# Patient Record
Sex: FEMALE | Race: WHITE | NOT HISPANIC OR LATINO | URBAN - METROPOLITAN AREA
[De-identification: names, ages, dates, MRNs, and addresses within clinical notes are randomized per-mention and may not be internally consistent; named-entity substitution may affect disease eponyms.]

---

## 2018-07-22 ENCOUNTER — INPATIENT (INPATIENT)
Facility: HOSPITAL | Age: 83
LOS: 1 days | Discharge: NURSING, REHAB OR LONG-TERM CARE | DRG: 179 | End: 2018-07-24
Attending: INTERNAL MEDICINE | Admitting: INTERNAL MEDICINE
Payer: MEDICARE

## 2018-07-22 VITALS
SYSTOLIC BLOOD PRESSURE: 126 MMHG | TEMPERATURE: 98 F | OXYGEN SATURATION: 95 % | DIASTOLIC BLOOD PRESSURE: 72 MMHG | HEART RATE: 74 BPM | RESPIRATION RATE: 16 BRPM

## 2018-07-22 DIAGNOSIS — R76.11 NONSPECIFIC REACTION TO TUBERCULIN SKIN TEST WITHOUT ACTIVE TUBERCULOSIS: ICD-10-CM

## 2018-07-22 LAB
ALBUMIN SERPL ELPH-MCNC: 3.8 G/DL — SIGNIFICANT CHANGE UP (ref 3.3–5)
ALP SERPL-CCNC: 53 U/L — SIGNIFICANT CHANGE UP (ref 40–120)
ALT FLD-CCNC: 13 U/L — SIGNIFICANT CHANGE UP (ref 10–45)
ANION GAP SERPL CALC-SCNC: 11 MMOL/L — SIGNIFICANT CHANGE UP (ref 5–17)
APTT BLD: 34.7 SEC — SIGNIFICANT CHANGE UP (ref 27.5–37.4)
AST SERPL-CCNC: 19 U/L — SIGNIFICANT CHANGE UP (ref 10–40)
BASOPHILS # BLD AUTO: 0 K/UL — SIGNIFICANT CHANGE UP (ref 0–0.2)
BASOPHILS NFR BLD AUTO: 0.5 % — SIGNIFICANT CHANGE UP (ref 0–2)
BILIRUB SERPL-MCNC: 1.5 MG/DL — HIGH (ref 0.2–1.2)
BUN SERPL-MCNC: 23 MG/DL — SIGNIFICANT CHANGE UP (ref 7–23)
CALCIUM SERPL-MCNC: 8.7 MG/DL — SIGNIFICANT CHANGE UP (ref 8.4–10.5)
CHLORIDE SERPL-SCNC: 105 MMOL/L — SIGNIFICANT CHANGE UP (ref 96–108)
CO2 SERPL-SCNC: 25 MMOL/L — SIGNIFICANT CHANGE UP (ref 22–31)
CREAT SERPL-MCNC: 0.55 MG/DL — SIGNIFICANT CHANGE UP (ref 0.5–1.3)
EOSINOPHIL # BLD AUTO: 0.3 K/UL — SIGNIFICANT CHANGE UP (ref 0–0.5)
EOSINOPHIL NFR BLD AUTO: 5.4 % — SIGNIFICANT CHANGE UP (ref 0–6)
GLUCOSE BLDC GLUCOMTR-MCNC: 154 MG/DL — HIGH (ref 70–99)
GLUCOSE BLDC GLUCOMTR-MCNC: 171 MG/DL — HIGH (ref 70–99)
GLUCOSE SERPL-MCNC: 151 MG/DL — HIGH (ref 70–99)
HCT VFR BLD CALC: 37.3 % — SIGNIFICANT CHANGE UP (ref 34.5–45)
HGB BLD-MCNC: 12.8 G/DL — SIGNIFICANT CHANGE UP (ref 11.5–15.5)
INR BLD: 1.46 RATIO — HIGH (ref 0.88–1.16)
LYMPHOCYTES # BLD AUTO: 1.2 K/UL — SIGNIFICANT CHANGE UP (ref 1–3.3)
LYMPHOCYTES # BLD AUTO: 19.1 % — SIGNIFICANT CHANGE UP (ref 13–44)
MCHC RBC-ENTMCNC: 30.6 PG — SIGNIFICANT CHANGE UP (ref 27–34)
MCHC RBC-ENTMCNC: 34.3 GM/DL — SIGNIFICANT CHANGE UP (ref 32–36)
MCV RBC AUTO: 89.3 FL — SIGNIFICANT CHANGE UP (ref 80–100)
MONOCYTES # BLD AUTO: 0.6 K/UL — SIGNIFICANT CHANGE UP (ref 0–0.9)
MONOCYTES NFR BLD AUTO: 10.2 % — SIGNIFICANT CHANGE UP (ref 2–14)
NEUTROPHILS # BLD AUTO: 4.1 K/UL — SIGNIFICANT CHANGE UP (ref 1.8–7.4)
NEUTROPHILS NFR BLD AUTO: 64.7 % — SIGNIFICANT CHANGE UP (ref 43–77)
PLATELET # BLD AUTO: 144 K/UL — LOW (ref 150–400)
POTASSIUM SERPL-MCNC: 4.1 MMOL/L — SIGNIFICANT CHANGE UP (ref 3.5–5.3)
POTASSIUM SERPL-SCNC: 4.1 MMOL/L — SIGNIFICANT CHANGE UP (ref 3.5–5.3)
PROT SERPL-MCNC: 6.3 G/DL — SIGNIFICANT CHANGE UP (ref 6–8.3)
PROTHROM AB SERPL-ACNC: 16 SEC — HIGH (ref 9.8–12.7)
RBC # BLD: 4.17 M/UL — SIGNIFICANT CHANGE UP (ref 3.8–5.2)
RBC # FLD: 12.3 % — SIGNIFICANT CHANGE UP (ref 10.3–14.5)
SODIUM SERPL-SCNC: 141 MMOL/L — SIGNIFICANT CHANGE UP (ref 135–145)
WBC # BLD: 6.3 K/UL — SIGNIFICANT CHANGE UP (ref 3.8–10.5)
WBC # FLD AUTO: 6.3 K/UL — SIGNIFICANT CHANGE UP (ref 3.8–10.5)

## 2018-07-22 PROCEDURE — 71045 X-RAY EXAM CHEST 1 VIEW: CPT | Mod: 26

## 2018-07-22 PROCEDURE — 99222 1ST HOSP IP/OBS MODERATE 55: CPT | Mod: GC

## 2018-07-22 RX ORDER — AMLODIPINE BESYLATE 2.5 MG/1
5 TABLET ORAL DAILY
Qty: 0 | Refills: 0 | Status: DISCONTINUED | OUTPATIENT
Start: 2018-07-22 | End: 2018-07-24

## 2018-07-22 RX ORDER — HEPARIN SODIUM 5000 [USP'U]/ML
5000 INJECTION INTRAVENOUS; SUBCUTANEOUS EVERY 12 HOURS
Qty: 0 | Refills: 0 | Status: DISCONTINUED | OUTPATIENT
Start: 2018-07-22 | End: 2018-07-22

## 2018-07-22 RX ORDER — CITALOPRAM 10 MG/1
10 TABLET, FILM COATED ORAL DAILY
Qty: 0 | Refills: 0 | Status: DISCONTINUED | OUTPATIENT
Start: 2018-07-22 | End: 2018-07-24

## 2018-07-22 RX ORDER — INSULIN LISPRO 100/ML
VIAL (ML) SUBCUTANEOUS AT BEDTIME
Qty: 0 | Refills: 0 | Status: DISCONTINUED | OUTPATIENT
Start: 2018-07-22 | End: 2018-07-24

## 2018-07-22 RX ORDER — DEXTROSE 50 % IN WATER 50 %
25 SYRINGE (ML) INTRAVENOUS ONCE
Qty: 0 | Refills: 0 | Status: DISCONTINUED | OUTPATIENT
Start: 2018-07-22 | End: 2018-07-24

## 2018-07-22 RX ORDER — SIMVASTATIN 20 MG/1
10 TABLET, FILM COATED ORAL AT BEDTIME
Qty: 0 | Refills: 0 | Status: DISCONTINUED | OUTPATIENT
Start: 2018-07-22 | End: 2018-07-24

## 2018-07-22 RX ORDER — INSULIN LISPRO 100/ML
VIAL (ML) SUBCUTANEOUS
Qty: 0 | Refills: 0 | Status: DISCONTINUED | OUTPATIENT
Start: 2018-07-22 | End: 2018-07-24

## 2018-07-22 RX ORDER — IPRATROPIUM BROMIDE 0.2 MG/ML
1 SOLUTION, NON-ORAL INHALATION
Qty: 0 | Refills: 0 | Status: DISCONTINUED | OUTPATIENT
Start: 2018-07-22 | End: 2018-07-24

## 2018-07-22 RX ORDER — SODIUM CHLORIDE 9 MG/ML
1000 INJECTION, SOLUTION INTRAVENOUS
Qty: 0 | Refills: 0 | Status: DISCONTINUED | OUTPATIENT
Start: 2018-07-22 | End: 2018-07-24

## 2018-07-22 RX ORDER — FAMOTIDINE 10 MG/ML
20 INJECTION INTRAVENOUS
Qty: 0 | Refills: 0 | Status: DISCONTINUED | OUTPATIENT
Start: 2018-07-22 | End: 2018-07-24

## 2018-07-22 RX ORDER — GLUCAGON INJECTION, SOLUTION 0.5 MG/.1ML
1 INJECTION, SOLUTION SUBCUTANEOUS ONCE
Qty: 0 | Refills: 0 | Status: DISCONTINUED | OUTPATIENT
Start: 2018-07-22 | End: 2018-07-24

## 2018-07-22 RX ORDER — LOSARTAN POTASSIUM 100 MG/1
50 TABLET, FILM COATED ORAL DAILY
Qty: 0 | Refills: 0 | Status: DISCONTINUED | OUTPATIENT
Start: 2018-07-22 | End: 2018-07-24

## 2018-07-22 RX ORDER — LEVETIRACETAM 250 MG/1
500 TABLET, FILM COATED ORAL
Qty: 0 | Refills: 0 | Status: DISCONTINUED | OUTPATIENT
Start: 2018-07-22 | End: 2018-07-24

## 2018-07-22 RX ORDER — DEXTROSE 50 % IN WATER 50 %
15 SYRINGE (ML) INTRAVENOUS ONCE
Qty: 0 | Refills: 0 | Status: DISCONTINUED | OUTPATIENT
Start: 2018-07-22 | End: 2018-07-24

## 2018-07-22 RX ORDER — APIXABAN 2.5 MG/1
2.5 TABLET, FILM COATED ORAL EVERY 12 HOURS
Qty: 0 | Refills: 0 | Status: DISCONTINUED | OUTPATIENT
Start: 2018-07-22 | End: 2018-07-24

## 2018-07-22 RX ORDER — DEXTROSE 50 % IN WATER 50 %
12.5 SYRINGE (ML) INTRAVENOUS ONCE
Qty: 0 | Refills: 0 | Status: DISCONTINUED | OUTPATIENT
Start: 2018-07-22 | End: 2018-07-24

## 2018-07-22 RX ORDER — OFLOXACIN 0.3 %
2 DROPS OPHTHALMIC (EYE) ONCE
Qty: 0 | Refills: 0 | Status: COMPLETED | OUTPATIENT
Start: 2018-07-22 | End: 2018-07-22

## 2018-07-22 RX ORDER — ACETAMINOPHEN 500 MG
650 TABLET ORAL EVERY 6 HOURS
Qty: 0 | Refills: 0 | Status: DISCONTINUED | OUTPATIENT
Start: 2018-07-22 | End: 2018-07-24

## 2018-07-22 RX ORDER — ALBUTEROL 90 UG/1
2 AEROSOL, METERED ORAL EVERY 6 HOURS
Qty: 0 | Refills: 0 | Status: DISCONTINUED | OUTPATIENT
Start: 2018-07-22 | End: 2018-07-24

## 2018-07-22 RX ORDER — METOPROLOL TARTRATE 50 MG
50 TABLET ORAL THREE TIMES A DAY
Qty: 0 | Refills: 0 | Status: DISCONTINUED | OUTPATIENT
Start: 2018-07-22 | End: 2018-07-24

## 2018-07-22 RX ORDER — DOCUSATE SODIUM 100 MG
100 CAPSULE ORAL DAILY
Qty: 0 | Refills: 0 | Status: DISCONTINUED | OUTPATIENT
Start: 2018-07-22 | End: 2018-07-24

## 2018-07-22 RX ADMIN — Medication 50 MILLIGRAM(S): at 21:53

## 2018-07-22 RX ADMIN — Medication 1 PUFF(S): at 23:25

## 2018-07-22 RX ADMIN — FAMOTIDINE 20 MILLIGRAM(S): 10 INJECTION INTRAVENOUS at 19:20

## 2018-07-22 RX ADMIN — SIMVASTATIN 10 MILLIGRAM(S): 20 TABLET, FILM COATED ORAL at 21:52

## 2018-07-22 RX ADMIN — APIXABAN 2.5 MILLIGRAM(S): 2.5 TABLET, FILM COATED ORAL at 21:52

## 2018-07-22 RX ADMIN — CITALOPRAM 10 MILLIGRAM(S): 10 TABLET, FILM COATED ORAL at 19:20

## 2018-07-22 RX ADMIN — Medication 2: at 13:18

## 2018-07-22 RX ADMIN — LOSARTAN POTASSIUM 50 MILLIGRAM(S): 100 TABLET, FILM COATED ORAL at 21:52

## 2018-07-22 RX ADMIN — Medication 100 MILLIGRAM(S): at 21:52

## 2018-07-22 RX ADMIN — Medication 2 DROP(S): at 11:14

## 2018-07-22 RX ADMIN — LEVETIRACETAM 500 MILLIGRAM(S): 250 TABLET, FILM COATED ORAL at 13:20

## 2018-07-22 RX ADMIN — AMLODIPINE BESYLATE 5 MILLIGRAM(S): 2.5 TABLET ORAL at 19:20

## 2018-07-22 NOTE — CONSULT NOTE ADULT - SUBJECTIVE AND OBJECTIVE BOX
HPI:  89F with multiple comorbidities, previously lived at home with family, began living in a nursing home . As part of the new admission process a PPD was placed and returned as "positive". A chest XR done 18 was read as bilateral pleural effusions with bibasal airspace opacities, tuberculosis cannot be excluded. She was then sent to the ED for possible TB.   Her first   of TB seventy-three years ago and she herself had been checked multiple times for TB but she and her family are not sure how, may have just been chest XR.   She has no cough, weight loss, fevers, chills, night sweats, dyspnea. She feels fine.     PAST MEDICAL & SURGICAL HISTORY:  HTN   Atrial fibrillation   CVA  Seizure disorder   Joint replacements     Allergies  Keflex (Unknown)    ANTIMICROBIALS:      OTHER MEDS: MEDICATIONS  (STANDING):  acetaminophen   Tablet. 650 every 6 hours PRN  ALBUTerol    90 MICROgram(s) HFA Inhaler 2 every 6 hours PRN  amLODIPine   Tablet 5 daily  apixaban 2.5 every 12 hours  citalopram 10 daily  dextrose 40% Gel 15 once PRN  dextrose 50% Injectable 12.5 once  dextrose 50% Injectable 25 once  dextrose 50% Injectable 25 once  docusate sodium 100 daily  famotidine    Tablet 20 two times a day  glucagon  Injectable 1 once PRN  insulin lispro (HumaLOG) corrective regimen sliding scale  three times a day before meals  insulin lispro (HumaLOG) corrective regimen sliding scale  at bedtime  ipratropium 17 MICROgram(s) HFA Inhaler 1 four times a day  levETIRAcetam 500 two times a day  losartan 50 daily  metoprolol tartrate 50 three times a day  simvastatin 10 at bedtime      SOCIAL HISTORY: Nonsmoker. Moved to a nursing home last week, used to live with family. . Retired.     FAMILY HISTORY: Noncontributory     REVIEW OF SYSTEMS  [  ] ROS unobtainable because:    [X] All other systems negative except as noted below:	    Constitutional:  [ ] fever [ ] chills  [ ] weight loss  [ ] weakness  Skin:  [ ] rash [ ] phlebitis	  Eyes: [ ] icterus [ ] pain  [ ] discharge	  ENMT: [ ] sore throat  [ ] thrush [ ] ulcers [ ] exudates  Respiratory: [ ] dyspnea [ ] hemoptysis [ ] cough [ ] sputum	  Cardiovascular:  [ ] chest pain [ ] palpitations [ ] edema	  Gastrointestinal:  [ ] nausea [ ] vomiting [ ] diarrhea [ ] constipation [ ] pain	  Genitourinary:  [ ] dysuria [ ] frequency [ ] hematuria [ ] discharge [ ] flank pain  [ ] incontinence  Musculoskeletal:  [ ] myalgias [ ] arthralgias [ ] arthritis  [ ] back pain  Neurological:  [ ] headache [ ] seizures  [ ] confusion/altered mental status  Psychiatric:  [ ] anxiety [ ] depression	  Hematology/Lymphatics:  [ ] lymphadenopathy  Endocrine:  [ ] adrenal [ ] thyroid  Allergic/Immunologic:	 [ ] transplant [ ] seasonal    Vital Signs Last 24 Hrs  T(F): 98 (18 @ 11:24), Max: 98 (18 @ 11:24)    Vital Signs Last 24 Hrs  HR: 61 (18 @ 11:24) (61 - 74)  BP: 123/90 (18 @ 08:30) (123/90 - 126/72)  RR: 18 (18 @ 11:24)  SpO2: 98% (18 @ 11:24) (90% - 98%)  Wt(kg): --    PHYSICAL EXAM:  General: non-toxic, awake, alert, well appearing  HEAD/EYES: clear conjunctiva, anicteric, PERRL  ENT:  neck supple, oral mucosa moist intact   Cardiovascular:   regular rate and rhythm   Respiratory:  nonlabored, clear bilaterally, slightly decreased sounds at the bases   GI:  soft, non-tender, normal bowel sounds  :  no CVA tenderness, no servin   Musculoskeletal:  no synovitis  Neurologic:  grossly non-focal  Skin:  no rash  Lymph: no lymphadenopathy  Psychiatric:  appropriate affect  Vascular:  no phlebitis                          12.8   6.3   )-----------( 144      ( 2018 09:17 )             37.3           141  |  105  |  23  ----------------------------<  151<H>  4.1   |  25  |  0.55    Ca    8.7      2018 09:17    TPro  6.3  /  Alb  3.8  /  TBili  1.5<H>  /  DBili  x   /  AST  19  /  ALT  13  /  AlkPhos  53            MICROBIOLOGY:        RADIOLOGY:  Xray Chest 1 View- PORTABLE-Urgent (18 @ 09:32)   No bilateral focal infiltrates.  No tuberculous changes seen. HPI:  89F with multiple comorbidities, previously lived at home with family, began living in a nursing home . As part of the new admission process a PPD was placed and returned as "positive". A chest XR done 18 was read as bilateral pleural effusions with bibasal airspace opacities, tuberculosis cannot be excluded. She was then sent to the ED for possible TB.   Her first   of TB seventy-three years ago and she herself had been checked multiple times for TB but she and her family are not sure how, may have just been chest XR.   She has no cough, weight loss, fevers, chills, night sweats, dyspnea. She feels fine.     PAST MEDICAL & SURGICAL HISTORY:  HTN   Atrial fibrillation   CVA  Seizure disorder   Joint replacements     Allergies  Keflex (Unknown)    ANTIMICROBIALS:      OTHER MEDS: MEDICATIONS  (STANDING):  acetaminophen   Tablet. 650 every 6 hours PRN  ALBUTerol    90 MICROgram(s) HFA Inhaler 2 every 6 hours PRN  amLODIPine   Tablet 5 daily  apixaban 2.5 every 12 hours  citalopram 10 daily  dextrose 40% Gel 15 once PRN  dextrose 50% Injectable 12.5 once  dextrose 50% Injectable 25 once  dextrose 50% Injectable 25 once  docusate sodium 100 daily  famotidine    Tablet 20 two times a day  glucagon  Injectable 1 once PRN  insulin lispro (HumaLOG) corrective regimen sliding scale  three times a day before meals  insulin lispro (HumaLOG) corrective regimen sliding scale  at bedtime  ipratropium 17 MICROgram(s) HFA Inhaler 1 four times a day  levETIRAcetam 500 two times a day  losartan 50 daily  metoprolol tartrate 50 three times a day  simvastatin 10 at bedtime      SOCIAL HISTORY: Nonsmoker. Moved to a nursing home last week, used to live with family. . Retired.     FAMILY HISTORY: Noncontributory     REVIEW OF SYSTEMS  [  ] ROS unobtainable because:    [X] All other systems negative except as noted below:	    Constitutional:  [ ] fever [ ] chills  [ ] weight loss  [ ] weakness  Skin:  [ ] rash [ ] phlebitis	  Eyes: [ ] icterus [ ] pain  [ ] discharge	  ENMT: [ ] sore throat  [ ] thrush [ ] ulcers [ ] exudates  Respiratory: [ ] dyspnea [ ] hemoptysis [ ] cough [ ] sputum	  Cardiovascular:  [ ] chest pain [ ] palpitations [ ] edema	  Gastrointestinal:  [ ] nausea [ ] vomiting [ ] diarrhea [ ] constipation [ ] pain	  Genitourinary:  [ ] dysuria [ ] frequency [ ] hematuria [ ] discharge [ ] flank pain  [ ] incontinence  Musculoskeletal:  [ ] myalgias [ ] arthralgias [ ] arthritis  [ ] back pain  Neurological:  [ ] headache [ ] seizures  [ ] confusion/altered mental status  Psychiatric:  [ ] anxiety [ ] depression	  Hematology/Lymphatics:  [ ] lymphadenopathy  Endocrine:  [ ] adrenal [ ] thyroid  Allergic/Immunologic:	 [ ] transplant [ ] seasonal    Vital Signs Last 24 Hrs  T(F): 98 (18 @ 11:24), Max: 98 (18 @ 11:24)    Vital Signs Last 24 Hrs  HR: 61 (18 @ 11:24) (61 - 74)  BP: 123/90 (18 @ 08:30) (123/90 - 126/72)  RR: 18 (18 @ 11:24)  SpO2: 98% (18 @ 11:24) (90% - 98%)  Wt(kg): --    PHYSICAL EXAM:  General: non-toxic, awake, alert, well appearing  HEAD/EYES: clear conjunctiva, anicteric, PERRL  ENT:  neck supple, oral mucosa moist intact   Cardiovascular:   regular rate and rhythm   Respiratory:  nonlabored, clear bilaterally, slightly decreased sounds at the bases   GI:  soft, non-tender, normal bowel sounds  :  no CVA tenderness, no servin   Musculoskeletal:  no synovitis  Neurologic:  grossly non-focal  Skin:  left volar aspect forearm about 3cm diameter patch of erythema, palpable swelling beneath the skin but no raised wheel   Lymph: no lymphadenopathy  Psychiatric:  appropriate affect  Vascular:  no phlebitis                          12.8   6.3   )-----------( 144      ( 2018 09:17 )             37.3       07-22    141  |  105  |  23  ----------------------------<  151<H>  4.1   |  25  |  0.55    Ca    8.7      2018 09:17    TPro  6.3  /  Alb  3.8  /  TBili  1.5<H>  /  DBili  x   /  AST  19  /  ALT  13  /  AlkPhos  53            MICROBIOLOGY:        RADIOLOGY:  Xray Chest 1 View- PORTABLE-Urgent (18 @ 09:32)   No bilateral focal infiltrates.  No tuberculous changes seen. HPI:  89F with multiple comorbidities, previously lived at home with family, began living in a nursing home . As part of the new admission process a PPD was placed and returned as "positive". A chest XR done 18 was read as bilateral pleural effusions with bibasal airspace opacities, tuberculosis cannot be excluded. She was then sent to the ED for possible TB.  Her first   of TB seventy-three years ago and she herself had been checked multiple times for TB but she and her family are not sure how, may have just been chest XR.   She has no cough, weight loss, fevers, chills, night sweats, dyspnea. She feels fine. Here her CXR is negative for TB.  Spoke in detail with two daughters.  Patient has been otherwise well.  NO SOB/cough/hemoptysis.  No weight loss.  Appetite is normal.  Other than not being able to really take care of her ADLs, she is fine.  CXR reviewed with radiology.  No suggestion of TB.    PAST MEDICAL & SURGICAL HISTORY:  HTN   Atrial fibrillation   CVA  Seizure disorder   Joint replacements     Allergies  Keflex (Unknown)    ANTIMICROBIALS:    none    OTHER MEDS: MEDICATIONS  (STANDING):  acetaminophen   Tablet. 650 every 6 hours PRN  amLODIPine   Tablet 5 daily  apixaban 2.5 every 12 hours  citalopram 10 daily  docusate sodium 100 daily  famotidine    Tablet 20 two times a day  insulin lispro (HumaLOG) corrective regimen sliding scale  three times a day before meals  insulin lispro (HumaLOG) corrective regimen sliding scale  at bedtime  ipratropium 17 MICROgram(s) HFA Inhaler 1 four times a day  levETIRAcetam 500 two times a day  losartan 50 daily  metoprolol tartrate 50 three times a day  simvastatin 10 at bedtime    SOCIAL HISTORY: Nonsmoker. Moved to a nursing home last week, used to live with family. . Retired.     FAMILY HISTORY: Noncontributory     REVIEW OF SYSTEMS  [  ] ROS unobtainable because:    [X] All other systems negative except as noted below:	    Constitutional:  [ ] fever [ ] chills  [ ] weight loss  [ ] weakness  Skin:  [ ] rash [ ] phlebitis	  Eyes: [ ] icterus [ ] pain  [ ] discharge	  ENMT: [ ] sore throat  [ ] thrush [ ] ulcers [ ] exudates  Respiratory: [ ] dyspnea [ ] hemoptysis [ ] cough [ ] sputum	  Cardiovascular:  [ ] chest pain [ ] palpitations [ ] edema	  Gastrointestinal:  [ ] nausea [ ] vomiting [ ] diarrhea [ ] constipation [ ] pain	  Genitourinary:  [ ] dysuria [ ] frequency [ ] hematuria [ ] discharge [ ] flank pain  [ ] incontinence  Musculoskeletal:  [ ] myalgias [ ] arthralgias [ ] arthritis  [ ] back pain  Neurological:  [ ] headache [ ] seizures  [ x] dementia  Psychiatric:  [ ] anxiety [ ] depression	  Hematology/Lymphatics:  [ ] lymphadenopathy  Endocrine:  [ ] adrenal [ ] thyroid  Allergic/Immunologic:	 [ ] transplant [ ] seasonal    Vital Signs Last 24 Hrs  T(F): 98 (18 @ 11:24), Max: 98 (18 @ 11:24)    Vital Signs Last 24 Hrs  HR: 61 (18 @ 11:24) (61 - 74)  BP: 123/90 (18 @ 08:30) (123/90 - 126/72)  RR: 18 (18 @ 11:24)  SpO2: 98% (18 @ 11:24) (90% - 98%)  Wt(kg): --    PHYSICAL EXAM:  General: non-toxic, appearing  HEAD/EYES: clear conjunctiva, anicteric  ENT:  neck supple, no thrush  Cardiovascular:   S1, S2  Respiratory:  clear b/l  GI:  soft, non-tender, normal bowel sounds  :  no servin   Musculoskeletal:  no synovitis  Neurologic:  grossly non-focal  Skin:  palpable induration >15mm   Psychiatric:  appropriate affect  Vascular:  no phlebitis                        12.8   6.3   )-----------( 144      ( 2018 09:17 )             37.3     141  |  105  |  23  ----------------------------<  151<H>  4.1   |  25  |  0.55    Ca    8.7      2018 09:17    TPro  6.3  /  Alb  3.8  /  TBili  1.5<H>  /  DBili  x   /  AST  19  /  ALT  13  /  AlkPhos  53  07-22    MICROBIOLOGY:  n/a      RADIOLOGY:  Xray Chest 1 View- PORTABLE-Urgent (18 @ 09:32)   No bilateral focal infiltrates. No tuberculous changes seen.

## 2018-07-22 NOTE — ED PROVIDER NOTE - OBJECTIVE STATEMENT
89 female sent in from nursing home with history of dementia, baseline dependence on all ADLs, not ambulatory, depression, HTN, chronic afib, CVA, GERD, OA here for positive PPD.  had PPD, read at 30 mm at .   of Tb 30 years ago, unsure if took abx prophylactically at any point in her life. No Cough, fever, shortness of breath. Abnormal CXR at nursing home. 89 female sent in from nursing home with history of dementia, baseline dependence on all ADLs, not ambulatory, depression, HTN, chronic afib, CVA, GERD, OA here for positive PPD.  had PPD, read at 30 mm at .   of Tb 70 years ago, unsure if took abx prophylactically at any point in her life. No Cough, fever, shortness of breath. Abnormal CXR at nursing home.

## 2018-07-22 NOTE — ED ADULT NURSE REASSESSMENT NOTE - NS ED NURSE REASSESS COMMENT FT1
Pt. in no acute distress. Breathing unlabored on 3L NC. Red socks applied, side rails remain up, bed remains in lowest position. Family at bedside. Comfort and safety measures in place. Pt. waiting for bed.

## 2018-07-22 NOTE — H&P ADULT - HISTORY OF PRESENT ILLNESS
89 female sent in from nursing home with history of dementia, baseline dependence on all ADLs, not ambulatory, depression, HTN, chronic afib, CVA, GERD, OA here for positive PPD.  had PPD, read at 30 mm at .   of Tb 70 years ago, unsure if took abx prophylactically at any point in her life. No Cough, fever, shortness of breath. Abnormal CXR at nursing home.

## 2018-07-22 NOTE — PATIENT PROFILE ADULT. - VISION (WITH CORRECTIVE LENSES IF THE PATIENT USUALLY WEARS THEM):
Partially impaired: cannot see medication labels or newsprint, but can see obstacles in path, and the surrounding layout; can count fingers at arm's length Blind left eye/Partially impaired: cannot see medication labels or newsprint, but can see obstacles in path, and the surrounding layout; can count fingers at arm's length

## 2018-07-22 NOTE — ED ADULT NURSE NOTE - ASSIGNED BED LOCATION
May shower tomorrow   ACTIVITY  · As tolerated and as directed by your doctor. · Bathe or shower as directed by your doctor. DIET  · Clear liquids until no nausea or vomiting; then light diet for the first day. · Advance to regular diet on second day, unless your doctor orders otherwise. · If nausea and vomiting continues, call your doctor. PAIN  · Take pain medication as directed by your doctor. · Call your doctor if pain is NOT relieved by medication. · DO NOT take aspirin of blood thinners unless directed by your doctor. DRESSING CARE       CALL YOUR DOCTOR IF   · Excessive bleeding that does not stop after holding pressure over the area  · Temperature of 101 degrees F or above  · Excessive redness, swelling or bruising, and/ or green or yellow, smelly discharge from incision    AFTER ANESTHESIA   · For the first 24 hours: DO NOT Drive, Drink alcoholic beverages, or Make important decisions. · Be aware of dizziness following anesthesia and while taking pain medication. APPOINTMENT DATE/ TIME    YOUR DOCTOR'S PHONE NUMBER       DISCHARGE SUMMARY from Nurse    PATIENT INSTRUCTIONS:    After general anesthesia or intravenous sedation, for 24 hours or while taking prescription Narcotics:  · Limit your activities  · Do not drive and operate hazardous machinery  · Do not make important personal or business decisions  · Do  not drink alcoholic beverages  · If you have not urinated within 8 hours after discharge, please contact your surgeon on call. *  Please give a list of your current medications to your Primary Care Provider. *  Please update this list whenever your medications are discontinued, doses are      changed, or new medications (including over-the-counter products) are added. *  Please carry medication information at all times in case of emergency situations.       These are general instructions for a healthy lifestyle:    No smoking/ No tobacco products/ Avoid exposure to second hand smoke    Surgeon General's Warning:  Quitting smoking now greatly reduces serious risk to your health. Obesity, smoking, and sedentary lifestyle greatly increases your risk for illness    A healthy diet, regular physical exercise & weight monitoring are important for maintaining a healthy lifestyle    You may be retaining fluid if you have a history of heart failure or if you experience any of the following symptoms:  Weight gain of 3 pounds or more overnight or 5 pounds in a week, increased swelling in our hands or feet or shortness of breath while lying flat in bed. Please call your doctor as soon as you notice any of these symptoms; do not wait until your next office visit. Recognize signs and symptoms of STROKE:    F-face looks uneven    A-arms unable to move or move unevenly    S-speech slurred or non-existent    T-time-call 911 as soon as signs and symptoms begin-DO NOT go       Back to bed or wait to see if you get better-TIME IS BRAIN. Austyn bose

## 2018-07-22 NOTE — CONSULT NOTE ADULT - ASSESSMENT
89F had a PPD placed after moving to a nursing home which returned as "positive" with an inconclusive CXR 7/20, referred for possible TB 7/22.   PPD does not appear positive. Large area of erythema but there is no actual raised wheel. CXR today suggests no tuberculous changes.   Quantiferon gold, if positive CT chest 89F had a PPD placed after moving to a nursing home which returned as "positive" with an inconclusive CXR 7/20, referred for possible TB 7/22.   PPD does not appear positive. Large area of erythema but there is no actual raised wheel. CXR today suggests no tuberculous changes, discussed with radiology.   Quantiferon gold 89F remote TB exposure with new admission to NH that prompted placement of a routine PPD that is positive.  CXR here at [facility] reviewed with radiology did not suggest evidence of active disease.  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  If, however, family feels strongly that it should be treated, unfortunately, the only option would be 9 months of INH/B6 due to drug-drug interaction of some of her medications with rifampin.  If nursing home will not take her back without having negative sputum results, I hope she can provide sputum, however, she is not at all coughing and family is not willing to subject her to bronchoscopically obtained specimen.  I do not feel that she requires isolation.    suggest:  - stop isolation  - not a good candidate for LTBI treatment given co-morbidities and drug-drug interactions    this was discussed in detail with medicine and infection prevention as well as family. all questions answered.    Please call Infectious Diseases if there is a change in status.  Thank you.  [phone].

## 2018-07-22 NOTE — H&P ADULT - ASSESSMENT
89 f with  PPD positive- ID evaluation, sputum for AFB, quantiferon gold TB  Seizures- continue meds  Diabetes control

## 2018-07-22 NOTE — ED PROVIDER NOTE - MEDICAL DECISION MAKING DETAILS
30 mm induration of PPD without symptoms, possible latent Tb. Will do ID consult, cxr, basic labs, likely dc home. 30 mm induration of PPD without symptoms, possible latent Tb. Will do ID consult, cxr, basic labs, dispo per ID 30 mm induration of PPD without symptoms, possible latent Tb. Will do ID consult, cxr, basic labs, dispo per ID  Margarito: 89 year old female from NH with dementia here for positive PPD. Patient with  with TB 70 years ago. will get labs, cxr, id consult, reassess.

## 2018-07-22 NOTE — ED ADULT NURSE REASSESSMENT NOTE - NS ED NURSE REASSESS COMMENT FT1
Pt. assisted with bedpan, voided scant amount of urine. Pt. repositioned in stretcher. Comfort and safety measures in place. pending transport to unit

## 2018-07-22 NOTE — ED ADULT NURSE REASSESSMENT NOTE - NS ED NURSE REASSESS COMMENT FT1
Pt. given meal tray, family states "it's too cold for her to eat." fingerstick to be rechecked upon arrival of food trays. Family concerned that nursing home did not given patient morning medications, spoke with BHARAT Batista, home dose of keppra to be ordered, family aware of plan of care.

## 2018-07-22 NOTE — H&P ADULT - NSHPSOCIALHISTORY_GEN_ALL_CORE
Social History:    Marital Status:  (   )    (   ) Single    (   )    ( x )   Occupation:   Lives with: (  ) alone  (  ) children   (  ) spouse   (  ) parents  (  x) other    Substance Use (street drugs): ( x ) never used  (  ) other:  Tobacco Usage:  ( x  ) never smoked   (   ) former smoker   (   ) current smoker  (     ) pack years  (        ) last cigarette date  Alcohol Usage: denies    (     ) Advanced Directives: (     ) None    (      ) DNR    (     ) DNI    (     ) Health Care Proxy:

## 2018-07-22 NOTE — H&P ADULT - NSHPLABSRESULTS_GEN_ALL_CORE
12.8   6.3   )-----------( 144      ( 22 Jul 2018 09:17 )             37.3       07-22    141  |  105  |  23  ----------------------------<  151<H>  4.1   |  25  |  0.55    Ca    8.7      22 Jul 2018 09:17    TPro  6.3  /  Alb  3.8  /  TBili  1.5<H>  /  DBili  x   /  AST  19  /  ALT  13  /  AlkPhos  53  07-22                  PT/INR - ( 22 Jul 2018 09:17 )   PT: 16.0 sec;   INR: 1.46 ratio         PTT - ( 22 Jul 2018 09:17 )  PTT:34.7 sec    Lactate Trend            CAPILLARY BLOOD GLUCOSE      EKG SR NSST/T  < from: Xray Chest 1 View- PORTABLE-Urgent (07.22.18 @ 09:32) >      IMPRESSION:   No bilateral focal infiltrates.  No tuberculous changes seen.    < end of copied text >

## 2018-07-22 NOTE — ED ADULT NURSE NOTE - OBJECTIVE STATEMENT
89 yo F presents to ED A+Ox2 for positive PPD from nursing home. As per daughter, pt. has baseline dementia and is currently at her baseline mental status, pt. disoriented to time. As per daughter, pt. is a new patient at a nursing home since Monday, had routine admission tests and exams, daughter states she was called last night and told that patient had a positive PPD and "something on her chest xray." Pt. arrives with red circular area to left forearm. Pt. denies chest pain, SOB, abdominal pain. Breathing unlabored on RA. Rhonchi noted in all lung fields. Oxygen saturation 90% on RA, pt. placed on 3L NC and saturation improved to 95%. Skin is warm pink dry and intact. Moves all extremities. Abdomen soft, nondistended, nontender. Bed in lowest position, ride rails up. Comfort and safety measures in place. Family at bedside, educated and verbalized understanding of wearing proper PPE mask while in patients room, verbalized understanding of how to remove and place on new mask prior to entering and exiting room/hallway. 89 yo F presents to ED A+Ox2 for positive PPD from nursing home. As per daughter, pt. has baseline dementia and is currently at her baseline mental status, pt. disoriented to time. As per daughter, pt. is a new patient at a nursing home since Monday, had routine admission tests and exams, daughter states she was called last night and told that patient had a positive PPD and "something on her chest xray." Pt. arrives with red circular area to left forearm. Pt. denies chest pain, SOB, abdominal pain. Breathing unlabored on RA. Rhonchi noted in all lung fields. Oxygen saturation 90% on RA, pt. placed on 3L NC and saturation improved to 95%. Skin is warm pink dry and intact, buttock noted to be red and blanchable. Moves all extremities. Abdomen soft, nondistended, nontender. Bed in lowest position, ride rails up. Comfort and safety measures in place. Family at bedside, educated and verbalized understanding of wearing proper PPE mask while in patients room, verbalized understanding of how to remove and place on new mask prior to entering and exiting room/hallway.

## 2018-07-22 NOTE — ED PROVIDER NOTE - PHYSICAL EXAMINATION
Gen: NAD, AOx1, non-toxic //            Head: NCAT //            HEENT: EOMI, oral mucosa moist, normal conjunctiva //            Lung: CTAB, no respiratory distress, no wheezes/rhonchi/rales B/L, speaking in full sentences. //            CV: RRR, no murmurs, rubs or gallops //            Abd: soft, NTND, no guarding, no CVA tenderness //            MSK: no visible deformities //            Neuro: No focal sensory or motor deficits //            Skin: Warm, well perfused, no rash //            Psych: normal affect. ~Javier Cruz M.D., Ph.D. -Resident

## 2018-07-22 NOTE — H&P ADULT - NSHPPHYSICALEXAM_GEN_ALL_CORE
PHYSICAL EXAMINATION:  Vital Signs Last 24 Hrs  T(C): 36.7 (22 Jul 2018 11:24), Max: 36.7 (22 Jul 2018 11:24)  T(F): 98 (22 Jul 2018 11:24), Max: 98 (22 Jul 2018 11:24)  HR: 61 (22 Jul 2018 11:24) (61 - 74)  BP: 123/90 (22 Jul 2018 08:30) (123/90 - 126/72)  BP(mean): --  RR: 18 (22 Jul 2018 11:24) (16 - 20)  SpO2: 98% (22 Jul 2018 11:24) (90% - 98%)  CAPILLARY BLOOD GLUCOSE          GENERAL: NAD, well-groomed, well-developed  HEAD:  atraumatic, normocephalic  EYES: sclera anicteric  ENMT: mucous membranes moist  NECK: supple, No JVD  CHEST/LUNG: clear to auscultation bilaterally; no rales, rhonchi, or wheezing b/l  HEART: normal S1, S2  ABDOMEN: BS+, soft, ND, NT   EXTREMITIES:  pulses palpable; no clubbing, cyanosis, or edema b/l LEs  NEURO: awake, alert, interactive; moves all extremities   SKIN: no rashes or lesions

## 2018-07-22 NOTE — ED PROVIDER NOTE - PROGRESS NOTE DETAILS
- MD Anthony,PhD - Resident: ID consult placed, ID fellow called, in discussion with ID decision was made for admission based on need for sputum cultures

## 2018-07-23 ENCOUNTER — TRANSCRIPTION ENCOUNTER (OUTPATIENT)
Age: 83
End: 2018-07-23

## 2018-07-23 LAB
APPEARANCE UR: ABNORMAL
BILIRUB UR-MCNC: NEGATIVE — SIGNIFICANT CHANGE UP
COLOR SPEC: YELLOW — SIGNIFICANT CHANGE UP
DIFF PNL FLD: NEGATIVE — SIGNIFICANT CHANGE UP
GLUCOSE BLDC GLUCOMTR-MCNC: 164 MG/DL — HIGH (ref 70–99)
GLUCOSE BLDC GLUCOMTR-MCNC: 165 MG/DL — HIGH (ref 70–99)
GLUCOSE BLDC GLUCOMTR-MCNC: 176 MG/DL — HIGH (ref 70–99)
GLUCOSE BLDC GLUCOMTR-MCNC: 187 MG/DL — HIGH (ref 70–99)
GLUCOSE UR QL: NEGATIVE MG/DL — SIGNIFICANT CHANGE UP
HBA1C BLD-MCNC: 8 % — HIGH (ref 4–5.6)
KETONES UR-MCNC: NEGATIVE — SIGNIFICANT CHANGE UP
LEUKOCYTE ESTERASE UR-ACNC: ABNORMAL
NITRITE UR-MCNC: NEGATIVE — SIGNIFICANT CHANGE UP
PH UR: 8 — SIGNIFICANT CHANGE UP (ref 5–8)
PROT UR-MCNC: NEGATIVE MG/DL — SIGNIFICANT CHANGE UP
SP GR SPEC: 1.01 — SIGNIFICANT CHANGE UP (ref 1.01–1.02)
UROBILINOGEN FLD QL: 2 MG/DL

## 2018-07-23 RX ADMIN — CITALOPRAM 10 MILLIGRAM(S): 10 TABLET, FILM COATED ORAL at 11:13

## 2018-07-23 RX ADMIN — AMLODIPINE BESYLATE 5 MILLIGRAM(S): 2.5 TABLET ORAL at 06:05

## 2018-07-23 RX ADMIN — Medication 100 MILLIGRAM(S): at 11:13

## 2018-07-23 RX ADMIN — Medication 50 MILLIGRAM(S): at 21:54

## 2018-07-23 RX ADMIN — Medication 2: at 08:37

## 2018-07-23 RX ADMIN — LEVETIRACETAM 500 MILLIGRAM(S): 250 TABLET, FILM COATED ORAL at 06:06

## 2018-07-23 RX ADMIN — LOSARTAN POTASSIUM 50 MILLIGRAM(S): 100 TABLET, FILM COATED ORAL at 06:06

## 2018-07-23 RX ADMIN — Medication 2: at 17:10

## 2018-07-23 RX ADMIN — FAMOTIDINE 20 MILLIGRAM(S): 10 INJECTION INTRAVENOUS at 06:06

## 2018-07-23 RX ADMIN — Medication 2: at 12:15

## 2018-07-23 RX ADMIN — APIXABAN 2.5 MILLIGRAM(S): 2.5 TABLET, FILM COATED ORAL at 08:36

## 2018-07-23 RX ADMIN — Medication 1 PUFF(S): at 11:13

## 2018-07-23 RX ADMIN — Medication 50 MILLIGRAM(S): at 06:09

## 2018-07-23 RX ADMIN — SIMVASTATIN 10 MILLIGRAM(S): 20 TABLET, FILM COATED ORAL at 21:54

## 2018-07-23 RX ADMIN — APIXABAN 2.5 MILLIGRAM(S): 2.5 TABLET, FILM COATED ORAL at 21:54

## 2018-07-23 RX ADMIN — Medication 1 PUFF(S): at 06:06

## 2018-07-23 RX ADMIN — Medication 1 PUFF(S): at 17:09

## 2018-07-23 RX ADMIN — Medication 50 MILLIGRAM(S): at 13:18

## 2018-07-23 RX ADMIN — LEVETIRACETAM 500 MILLIGRAM(S): 250 TABLET, FILM COATED ORAL at 17:08

## 2018-07-23 RX ADMIN — FAMOTIDINE 20 MILLIGRAM(S): 10 INJECTION INTRAVENOUS at 17:08

## 2018-07-23 NOTE — DISCHARGE NOTE ADULT - PLAN OF CARE
resolution Suspect Latent Tuberculosis infection and that the risks outweigh the benefits of treatment of Latent Tuberculosis Infection.    If, however, family feels strongly that it should be treated, unfortunately, the only option would be 9 months of INH/B6 due to drug-drug interaction of some of her medications with rifampin. Suspect Latent Tuberculosis infection   Follow up with your Primary care doctor in 1 week Continue keppra Continue Eliquis

## 2018-07-23 NOTE — DISCHARGE NOTE ADULT - PATIENT PORTAL LINK FT
You can access the [facility] Patient Portal, offered by [facility], by registering with the following website: [email or web address]

## 2018-07-23 NOTE — DISCHARGE NOTE ADULT - VISION (WITH CORRECTIVE LENSES IF THE PATIENT USUALLY WEARS THEM):
Partially impaired: cannot see medication labels or newsprint, but can see obstacles in path, and the surrounding layout; can count fingers at arm's length/Blind left eye

## 2018-07-23 NOTE — DISCHARGE NOTE ADULT - CARE PROVIDER_API CALL
Susan Mccallum), Critical Care Medicine; Internal Medicine; Pulmonary Disease  [address] [place] [address]  Phone: [phone]  Fax: [phone]

## 2018-07-23 NOTE — DISCHARGE NOTE ADULT - CARE PLAN
Principal Discharge DX:	Positive PPD Principal Discharge DX:	Positive PPD  Goal:	resolution  Assessment and plan of treatment:	Suspect Latent Tuberculosis infection and that the risks outweigh the benefits of treatment of Latent Tuberculosis Infection.    If, however, family feels strongly that it should be treated, unfortunately, the only option would be 9 months of INH/B6 due to drug-drug interaction of some of her medications with rifampin. Principal Discharge DX:	Positive PPD  Goal:	resolution  Assessment and plan of treatment:	Suspect Latent Tuberculosis infection   Follow up with your Primary care doctor in 1 week  Secondary Diagnosis:	Seizure  Assessment and plan of treatment:	Continue keppra  Secondary Diagnosis:	Atrial fibrillation  Assessment and plan of treatment:	Continue Eliquis

## 2018-07-23 NOTE — DISCHARGE NOTE ADULT - MEDICATION SUMMARY - MEDICATIONS TO TAKE
I will START or STAY ON the medications listed below when I get home from the hospital:    Tylenol 325 mg oral tablet  -- 2 tab(s) by mouth once a day, As Needed  -- Indication: For Pain    losartan 50 mg oral tablet  -- 1 tab(s) by mouth once a day  -- Indication: For hypertension    apixaban 2.5 mg oral tablet  -- 1 tab(s) by mouth every 12 hours  -- Indication: For afib    levETIRAcetam 500 mg oral tablet  -- 1 tab(s) by mouth 2 times a day  -- Indication: For anti-seizure    citalopram 10 mg oral tablet  -- 1 tab(s) by mouth once a day  -- Indication: For depression    Januvia 25 mg oral tablet  -- 1 tab(s) by mouth once a day  -- Indication: For diabetes    simvastatin 10 mg oral tablet  -- 1 tab(s) by mouth once a day (at bedtime)  -- Indication: For high cholesterol    metoprolol tartrate 50 mg oral tablet  -- 1 tab(s) by mouth 3 times a day  -- Indication: For hypertension    albuterol 90 mcg/inh inhalation aerosol  -- 2 puff(s) inhaled every 6 hours, As needed, Shortness of Breath and/or Wheezing  -- Indication: For respiratory    ipratropium CFC free 17 mcg/inh inhalation aerosol  -- 1 puff(s) inhaled 4 times a day  -- Indication: For respiratory    amLODIPine 5 mg oral tablet  -- 1 tab(s) by mouth once a day  -- Indication: For hypertension    famotidine 20 mg oral tablet  -- 1 tab(s) by mouth 2 times a day  -- Indication: For acid reflux    docusate sodium 100 mg oral capsule  -- 1 cap(s) by mouth once a day  -- Indication: For constipation I will START or STAY ON the medications listed below when I get home from the hospital:    Tylenol 325 mg oral tablet  -- 2 tab(s) by mouth once a day, As Needed  -- Indication: For Pain    losartan 50 mg oral tablet  -- 1 tab(s) by mouth once a day  -- Indication: For hypertension    apixaban 2.5 mg oral tablet  -- 1 tab(s) by mouth every 12 hours  -- Indication: For afib    levETIRAcetam 500 mg oral tablet  -- 1 tab(s) by mouth 2 times a day  -- Indication: For anti-seizure    citalopram 10 mg oral tablet  -- 1 tab(s) by mouth once a day  -- Indication: For depression    Januvia 25 mg oral tablet  -- 1 tab(s) by mouth once a day  -- Indication: For diabetes    simvastatin 10 mg oral tablet  -- 1 tab(s) by mouth once a day (at bedtime)  -- Indication: For high cholesterol    metoprolol tartrate 50 mg oral tablet  -- 1 tab(s) by mouth 3 times a day  -- Indication: For hypertension    albuterol 90 mcg/inh inhalation aerosol  -- 2 puff(s) inhaled every 6 hours, As needed, Shortness of Breath and/or Wheezing  -- Indication: For respiratory    ipratropium CFC free 17 mcg/inh inhalation aerosol  -- 1 puff(s) inhaled 4 times a day  -- Indication: For respiratory    amLODIPine 5 mg oral tablet  -- 1 tab(s) by mouth once a day  -- Indication: For hypertension    famotidine 20 mg oral tablet  -- 1 tab(s) by mouth 2 times a day  -- Indication: For acid reflux    docusate sodium 100 mg oral capsule  -- 1 cap(s) by mouth once a day  -- Indication: For constipation    ofloxacin 0.3% ophthalmic solution  -- 2 drop(s) to each affected eye once a day x 3 days  -- Indication: For conjunctivitis

## 2018-07-24 VITALS
SYSTOLIC BLOOD PRESSURE: 110 MMHG | OXYGEN SATURATION: 94 % | TEMPERATURE: 98 F | DIASTOLIC BLOOD PRESSURE: 61 MMHG | RESPIRATION RATE: 18 BRPM | HEART RATE: 67 BPM

## 2018-07-24 LAB
ANION GAP SERPL CALC-SCNC: 11 MMOL/L — SIGNIFICANT CHANGE UP (ref 5–17)
BUN SERPL-MCNC: 19 MG/DL — SIGNIFICANT CHANGE UP (ref 7–23)
CALCIUM SERPL-MCNC: 9 MG/DL — SIGNIFICANT CHANGE UP (ref 8.4–10.5)
CHLORIDE SERPL-SCNC: 105 MMOL/L — SIGNIFICANT CHANGE UP (ref 96–108)
CO2 SERPL-SCNC: 25 MMOL/L — SIGNIFICANT CHANGE UP (ref 22–31)
CREAT SERPL-MCNC: 0.57 MG/DL — SIGNIFICANT CHANGE UP (ref 0.5–1.3)
GLUCOSE BLDC GLUCOMTR-MCNC: 146 MG/DL — HIGH (ref 70–99)
GLUCOSE BLDC GLUCOMTR-MCNC: 244 MG/DL — HIGH (ref 70–99)
GLUCOSE SERPL-MCNC: 140 MG/DL — HIGH (ref 70–99)
HCT VFR BLD CALC: 38 % — SIGNIFICANT CHANGE UP (ref 34.5–45)
HGB BLD-MCNC: 12.7 G/DL — SIGNIFICANT CHANGE UP (ref 11.5–15.5)
MCHC RBC-ENTMCNC: 28.9 PG — SIGNIFICANT CHANGE UP (ref 27–34)
MCHC RBC-ENTMCNC: 33.4 GM/DL — SIGNIFICANT CHANGE UP (ref 32–36)
MCV RBC AUTO: 86.4 FL — SIGNIFICANT CHANGE UP (ref 80–100)
PLATELET # BLD AUTO: 155 K/UL — SIGNIFICANT CHANGE UP (ref 150–400)
POTASSIUM SERPL-MCNC: 3.9 MMOL/L — SIGNIFICANT CHANGE UP (ref 3.5–5.3)
POTASSIUM SERPL-SCNC: 3.9 MMOL/L — SIGNIFICANT CHANGE UP (ref 3.5–5.3)
RBC # BLD: 4.4 M/UL — SIGNIFICANT CHANGE UP (ref 3.8–5.2)
RBC # FLD: 13.6 % — SIGNIFICANT CHANGE UP (ref 10.3–14.5)
SODIUM SERPL-SCNC: 141 MMOL/L — SIGNIFICANT CHANGE UP (ref 135–145)
WBC # BLD: 5.61 K/UL — SIGNIFICANT CHANGE UP (ref 3.8–10.5)
WBC # FLD AUTO: 5.61 K/UL — SIGNIFICANT CHANGE UP (ref 3.8–10.5)

## 2018-07-24 PROCEDURE — 82962 GLUCOSE BLOOD TEST: CPT

## 2018-07-24 PROCEDURE — 80053 COMPREHEN METABOLIC PANEL: CPT

## 2018-07-24 PROCEDURE — 86480 TB TEST CELL IMMUN MEASURE: CPT

## 2018-07-24 PROCEDURE — 81001 URINALYSIS AUTO W/SCOPE: CPT

## 2018-07-24 PROCEDURE — 99285 EMERGENCY DEPT VISIT HI MDM: CPT

## 2018-07-24 PROCEDURE — 85610 PROTHROMBIN TIME: CPT

## 2018-07-24 PROCEDURE — 80048 BASIC METABOLIC PNL TOTAL CA: CPT

## 2018-07-24 PROCEDURE — 71045 X-RAY EXAM CHEST 1 VIEW: CPT

## 2018-07-24 PROCEDURE — 94640 AIRWAY INHALATION TREATMENT: CPT

## 2018-07-24 PROCEDURE — 85027 COMPLETE CBC AUTOMATED: CPT

## 2018-07-24 PROCEDURE — 85730 THROMBOPLASTIN TIME PARTIAL: CPT

## 2018-07-24 PROCEDURE — 83036 HEMOGLOBIN GLYCOSYLATED A1C: CPT

## 2018-07-24 RX ORDER — IPRATROPIUM BROMIDE 0.2 MG/ML
1 SOLUTION, NON-ORAL INHALATION
Qty: 0 | Refills: 0 | COMMUNITY
Start: 2018-07-24

## 2018-07-24 RX ORDER — LEVETIRACETAM 250 MG/1
1 TABLET, FILM COATED ORAL
Qty: 0 | Refills: 0 | COMMUNITY
Start: 2018-07-24

## 2018-07-24 RX ORDER — OFLOXACIN 0.3 %
2 DROPS OPHTHALMIC (EYE)
Qty: 0 | Refills: 0 | COMMUNITY
Start: 2018-07-24

## 2018-07-24 RX ORDER — SIMVASTATIN 20 MG/1
1 TABLET, FILM COATED ORAL
Qty: 0 | Refills: 0 | COMMUNITY
Start: 2018-07-24

## 2018-07-24 RX ORDER — DOCUSATE SODIUM 100 MG
1 CAPSULE ORAL
Qty: 0 | Refills: 0 | COMMUNITY
Start: 2018-07-24

## 2018-07-24 RX ORDER — FAMOTIDINE 10 MG/ML
1 INJECTION INTRAVENOUS
Qty: 0 | Refills: 0 | COMMUNITY
Start: 2018-07-24

## 2018-07-24 RX ORDER — AMLODIPINE BESYLATE 2.5 MG/1
1 TABLET ORAL
Qty: 0 | Refills: 0 | COMMUNITY
Start: 2018-07-24

## 2018-07-24 RX ORDER — METOPROLOL TARTRATE 50 MG
1 TABLET ORAL
Qty: 0 | Refills: 0 | COMMUNITY
Start: 2018-07-24

## 2018-07-24 RX ORDER — ALBUTEROL 90 UG/1
2 AEROSOL, METERED ORAL
Qty: 0 | Refills: 0 | COMMUNITY
Start: 2018-07-24

## 2018-07-24 RX ORDER — APIXABAN 2.5 MG/1
1 TABLET, FILM COATED ORAL
Qty: 0 | Refills: 0 | COMMUNITY
Start: 2018-07-24

## 2018-07-24 RX ORDER — CITALOPRAM 10 MG/1
1 TABLET, FILM COATED ORAL
Qty: 0 | Refills: 0 | COMMUNITY
Start: 2018-07-24

## 2018-07-24 RX ORDER — LOSARTAN POTASSIUM 100 MG/1
1 TABLET, FILM COATED ORAL
Qty: 0 | Refills: 0 | COMMUNITY
Start: 2018-07-24

## 2018-07-24 RX ORDER — OFLOXACIN 0.3 %
2 DROPS OPHTHALMIC (EYE) DAILY
Qty: 0 | Refills: 0 | Status: DISCONTINUED | OUTPATIENT
Start: 2018-07-24 | End: 2018-07-24

## 2018-07-24 RX ADMIN — CITALOPRAM 10 MILLIGRAM(S): 10 TABLET, FILM COATED ORAL at 12:35

## 2018-07-24 RX ADMIN — LOSARTAN POTASSIUM 50 MILLIGRAM(S): 100 TABLET, FILM COATED ORAL at 05:32

## 2018-07-24 RX ADMIN — Medication 100 MILLIGRAM(S): at 12:35

## 2018-07-24 RX ADMIN — Medication 50 MILLIGRAM(S): at 13:34

## 2018-07-24 RX ADMIN — Medication 1 PUFF(S): at 05:32

## 2018-07-24 RX ADMIN — APIXABAN 2.5 MILLIGRAM(S): 2.5 TABLET, FILM COATED ORAL at 09:17

## 2018-07-24 RX ADMIN — AMLODIPINE BESYLATE 5 MILLIGRAM(S): 2.5 TABLET ORAL at 05:32

## 2018-07-24 RX ADMIN — Medication 4: at 12:18

## 2018-07-24 RX ADMIN — Medication 50 MILLIGRAM(S): at 05:32

## 2018-07-24 RX ADMIN — FAMOTIDINE 20 MILLIGRAM(S): 10 INJECTION INTRAVENOUS at 05:32

## 2018-07-24 RX ADMIN — Medication 1 PUFF(S): at 12:35

## 2018-07-24 RX ADMIN — LEVETIRACETAM 500 MILLIGRAM(S): 250 TABLET, FILM COATED ORAL at 05:32

## 2018-07-24 NOTE — PROGRESS NOTE ADULT - ASSESSMENT
89 f with  PPD positive- ID evaluation noted  Sputum for AFB, QuantiFeron gold TB pending   Seizures- continue meds  Diabetes control  d/w daughter. Refusing INH Rx or bronchoscopy.  DCP in progress  Further action as per clinical course   Howard Saba MD pager [phone]
89 f with  PPD positive- ID evaluation noted  Sputum for AFB, QuantiFeron gold TB pending   Seizures- continue meds  Diabetes control  d/w daughter. Refusing INH Rx or bronchoscopy.  DCP in progress to NH  Howard Saba MD pager [phone]

## 2018-07-24 NOTE — PROGRESS NOTE ADULT - SUBJECTIVE AND OBJECTIVE BOX
Patient is a 89y old  Female who presents with a chief complaint of PPD positive (2018 14:00)      SUBJECTIVE / OVERNIGHT EVENTS: Comfortable without new complaints. Daughter at bedside.  Review of Systems  chest pain no  palpitations no  sob no  nausea no  headache no    MEDICATIONS  (STANDING):  amLODIPine   Tablet 5 milliGRAM(s) Oral daily  apixaban 2.5 milliGRAM(s) Oral every 12 hours  citalopram 10 milliGRAM(s) Oral daily  dextrose 5%. 1000 milliLiter(s) (50 mL/Hr) IV Continuous <Continuous>  dextrose 50% Injectable 12.5 Gram(s) IV Push once  dextrose 50% Injectable 25 Gram(s) IV Push once  dextrose 50% Injectable 25 Gram(s) IV Push once  docusate sodium 100 milliGRAM(s) Oral daily  famotidine    Tablet 20 milliGRAM(s) Oral two times a day  insulin lispro (HumaLOG) corrective regimen sliding scale   SubCutaneous three times a day before meals  insulin lispro (HumaLOG) corrective regimen sliding scale   SubCutaneous at bedtime  ipratropium 17 MICROgram(s) HFA Inhaler 1 Puff(s) Inhalation four times a day  levETIRAcetam 500 milliGRAM(s) Oral two times a day  losartan 50 milliGRAM(s) Oral daily  metoprolol tartrate 50 milliGRAM(s) Oral three times a day  simvastatin 10 milliGRAM(s) Oral at bedtime    MEDICATIONS  (PRN):  acetaminophen   Tablet. 650 milliGRAM(s) Oral every 6 hours PRN Mild Pain (1 - 3)  ALBUTerol    90 MICROgram(s) HFA Inhaler 2 Puff(s) Inhalation every 6 hours PRN Shortness of Breath and/or Wheezing  dextrose 40% Gel 15 Gram(s) Oral once PRN Blood Glucose LESS THAN 70 milliGRAM(s)/deciliter  glucagon  Injectable 1 milliGRAM(s) IntraMuscular once PRN Glucose LESS THAN 70 milligrams/deciliter          PHYSICAL EXAM:  GENERAL: NAD  HEAD:  Atraumatic, Normocephalic  EYES: EOMI, PERRLA, conjunctiva and sclera clear  NECK: Supple, No JVD  CHEST/LUNG: Clear to auscultation bilaterally; No wheeze  HEART: Regular rate and rhythm; No murmurs, rubs, or gallops  ABDOMEN: Soft, Nontender, Nondistended; Bowel sounds present  EXTREMITIES:  2+ Peripheral Pulses, No clubbing, cyanosis, or edema  PSYCH: confused  NEUROLOGY: non-focal  SKIN: No rashes or lesions    LABS:                        12.8   6.3   )-----------( 144      ( 2018 09:17 )             37.3     -    141  |  105  |  23  ----------------------------<  151<H>  4.1   |  25  |  0.55    Ca    8.7      2018 09:17    TPro  6.3  /  Alb  3.8  /  TBili  1.5<H>  /  DBili  x   /  AST  19  /  ALT  13  /  AlkPhos  53      PT/INR - ( 2018 09:17 )   PT: 16.0 sec;   INR: 1.46 ratio         PTT - ( 2018 09:17 )  PTT:34.7 sec      Urinalysis Basic - ( 2018 06:36 )    Color: Yellow / Appearance: Slightly Turbid / S.015 / pH: x  Gluc: x / Ketone: Negative  / Bili: Negative / Urobili: 2.0 mg/dL   Blood: x / Protein: Negative mg/dL / Nitrite: Negative   Leuk Esterase: Large / RBC: 5 /HPF / WBC 68 /HPF   Sq Epi: x / Non Sq Epi: 1 /HPF / Bacteria: Many        RADIOLOGY & ADDITIONAL TESTS:    Imaging Personally Reviewed:    Consultant(s) Notes Reviewed:      Care Discussed with Consultants/Other Providers:
Patient is a 89y old  Female who presents with a chief complaint of PPD positive (2018 14:00)      SUBJECTIVE / OVERNIGHT EVENTS: Comfortable without new complaints.   Review of Systems  chest pain no  palpitations no  sob no  nausea no  headache no    MEDICATIONS  (STANDING):  amLODIPine   Tablet 5 milliGRAM(s) Oral daily  apixaban 2.5 milliGRAM(s) Oral every 12 hours  citalopram 10 milliGRAM(s) Oral daily  dextrose 5%. 1000 milliLiter(s) (50 mL/Hr) IV Continuous <Continuous>  dextrose 50% Injectable 12.5 Gram(s) IV Push once  dextrose 50% Injectable 25 Gram(s) IV Push once  dextrose 50% Injectable 25 Gram(s) IV Push once  docusate sodium 100 milliGRAM(s) Oral daily  famotidine    Tablet 20 milliGRAM(s) Oral two times a day  insulin lispro (HumaLOG) corrective regimen sliding scale   SubCutaneous three times a day before meals  insulin lispro (HumaLOG) corrective regimen sliding scale   SubCutaneous at bedtime  ipratropium 17 MICROgram(s) HFA Inhaler 1 Puff(s) Inhalation four times a day  levETIRAcetam 500 milliGRAM(s) Oral two times a day  losartan 50 milliGRAM(s) Oral daily  metoprolol tartrate 50 milliGRAM(s) Oral three times a day  simvastatin 10 milliGRAM(s) Oral at bedtime    MEDICATIONS  (PRN):  acetaminophen   Tablet. 650 milliGRAM(s) Oral every 6 hours PRN Mild Pain (1 - 3)  ALBUTerol    90 MICROgram(s) HFA Inhaler 2 Puff(s) Inhalation every 6 hours PRN Shortness of Breath and/or Wheezing  dextrose 40% Gel 15 Gram(s) Oral once PRN Blood Glucose LESS THAN 70 milliGRAM(s)/deciliter  glucagon  Injectable 1 milliGRAM(s) IntraMuscular once PRN Glucose LESS THAN 70 milligrams/deciliter      Vital Signs Last 24 Hrs  T(C): 36.4 (2018 04:17), Max: 37.4 (2018 21:25)  T(F): 97.5 (2018 04:17), Max: 99.4 (2018 21:25)  HR: 70 (2018 04:17) (69 - 77)  BP: 127/77 (2018 04:17) (114/72 - 131/60)  BP(mean): --  RR: 18 (2018 04:17) (18 - 18)  SpO2: 95% (2018 04:17) (94% - 95%)    PHYSICAL EXAM:  GENERAL: NAD, well-developed  HEAD:  Atraumatic, Normocephalic  EYES: EOMI, PERRLA, conjunctiva and sclera clear  NECK: Supple, No JVD  CHEST/LUNG: Clear to auscultation bilaterally; No wheeze  HEART: Regular rate and rhythm; No murmurs, rubs, or gallops  ABDOMEN: Soft, Nontender, Nondistended; Bowel sounds present  EXTREMITIES:  2+ Peripheral Pulses, No clubbing, cyanosis, or edema  PSYCH: AAOx3  NEUROLOGY: non-focal  SKIN: No rashes or lesions    LABS:                        12.7   5.61  )-----------( 155      ( 2018 08:33 )             38.0     07-24    141  |  105  |  19  ----------------------------<  140<H>  3.9   |  25  |  0.57    Ca    9.0      2018 06:20            Urinalysis Basic - ( 2018 06:36 )    Color: Yellow / Appearance: Slightly Turbid / S.015 / pH: x  Gluc: x / Ketone: Negative  / Bili: Negative / Urobili: 2.0 mg/dL   Blood: x / Protein: Negative mg/dL / Nitrite: Negative   Leuk Esterase: Large / RBC: 5 /HPF / WBC 68 /HPF   Sq Epi: x / Non Sq Epi: 1 /HPF / Bacteria: Many          RADIOLOGY & ADDITIONAL TESTS:    Imaging Personally Reviewed:    Consultant(s) Notes Reviewed:      Care Discussed with Consultants/Other Providers:

## 2018-07-27 RX ORDER — CITALOPRAM 10 MG/1
1 TABLET, FILM COATED ORAL
Qty: 0 | Refills: 0 | COMMUNITY

## 2018-07-27 RX ORDER — DOCUSATE SODIUM 100 MG
1 CAPSULE ORAL
Qty: 0 | Refills: 0 | COMMUNITY

## 2018-07-27 RX ORDER — ACETAMINOPHEN 500 MG
2 TABLET ORAL
Qty: 0 | Refills: 0 | COMMUNITY

## 2018-07-27 RX ORDER — SIMVASTATIN 20 MG/1
1 TABLET, FILM COATED ORAL
Qty: 0 | Refills: 0 | COMMUNITY

## 2018-07-27 RX ORDER — LOSARTAN POTASSIUM 100 MG/1
1 TABLET, FILM COATED ORAL
Qty: 0 | Refills: 0 | COMMUNITY

## 2018-07-27 RX ORDER — AMLODIPINE BESYLATE 2.5 MG/1
1 TABLET ORAL
Qty: 0 | Refills: 0 | COMMUNITY

## 2018-07-27 RX ORDER — FAMOTIDINE 10 MG/ML
1 INJECTION INTRAVENOUS
Qty: 0 | Refills: 0 | COMMUNITY

## 2018-07-27 RX ORDER — APIXABAN 2.5 MG/1
1 TABLET, FILM COATED ORAL
Qty: 0 | Refills: 0 | COMMUNITY

## 2018-07-27 RX ORDER — LEVETIRACETAM 250 MG/1
1 TABLET, FILM COATED ORAL
Qty: 0 | Refills: 0 | COMMUNITY

## 2018-07-27 RX ORDER — IPRATROPIUM BROMIDE 0.2 MG/ML
1 SOLUTION, NON-ORAL INHALATION
Qty: 0 | Refills: 0 | COMMUNITY

## 2018-07-27 RX ORDER — ALBUTEROL 90 UG/1
3 AEROSOL, METERED ORAL
Qty: 0 | Refills: 0 | COMMUNITY

## 2018-07-27 RX ORDER — SITAGLIPTIN 50 MG/1
1 TABLET, FILM COATED ORAL
Qty: 0 | Refills: 0 | COMMUNITY

## 2018-07-27 RX ORDER — METOPROLOL TARTRATE 50 MG
1 TABLET ORAL
Qty: 0 | Refills: 0 | COMMUNITY

## 2020-01-22 NOTE — ED ADULT NURSE NOTE - TEMPLATE
Procedure(s):  ESOPHAGOGASTRODUODENOSCOPY (EGD)  ESOPHAGOGASTRODUODENAL (EGD) BIOPSY. MAC    Anesthesia Post Evaluation        Patient location during evaluation: bedside  Level of consciousness: awake  Pain management: satisfactory to patient  Airway patency: patent  Anesthetic complications: no  Cardiovascular status: acceptable  Respiratory status: acceptable  Hydration status: acceptable        Vitals Value Taken Time   /87 1/22/2020  1:45 PM   Temp     Pulse 65 1/22/2020  1:50 PM   Resp 9 1/22/2020  1:50 PM   SpO2 96 % 1/22/2020  1:50 PM   Vitals shown include unvalidated device data.
Respiratory
